# Patient Record
Sex: FEMALE | Race: WHITE | NOT HISPANIC OR LATINO | URBAN - METROPOLITAN AREA
[De-identification: names, ages, dates, MRNs, and addresses within clinical notes are randomized per-mention and may not be internally consistent; named-entity substitution may affect disease eponyms.]

---

## 2018-07-25 ENCOUNTER — EMERGENCY (EMERGENCY)
Facility: HOSPITAL | Age: 58
LOS: 1 days | Discharge: HOME | End: 2018-07-25
Attending: EMERGENCY MEDICINE
Payer: COMMERCIAL

## 2018-07-25 VITALS
RESPIRATION RATE: 16 BRPM | OXYGEN SATURATION: 98 % | DIASTOLIC BLOOD PRESSURE: 73 MMHG | HEIGHT: 63 IN | WEIGHT: 125 LBS | TEMPERATURE: 98 F | SYSTOLIC BLOOD PRESSURE: 122 MMHG | HEART RATE: 80 BPM

## 2018-07-25 VITALS
DIASTOLIC BLOOD PRESSURE: 76 MMHG | OXYGEN SATURATION: 97 % | RESPIRATION RATE: 16 BRPM | SYSTOLIC BLOOD PRESSURE: 127 MMHG | HEART RATE: 67 BPM

## 2018-07-25 LAB
ALBUMIN SERPL ELPH-MCNC: 3.7 G/DL — SIGNIFICANT CHANGE UP (ref 3.3–5)
ALP SERPL-CCNC: 79 U/L — SIGNIFICANT CHANGE UP (ref 40–120)
ALT FLD-CCNC: 22 U/L — SIGNIFICANT CHANGE UP (ref 12–78)
ANION GAP SERPL CALC-SCNC: 8 MMOL/L — SIGNIFICANT CHANGE UP (ref 5–17)
AST SERPL-CCNC: 17 U/L — SIGNIFICANT CHANGE UP (ref 15–37)
BASOPHILS # BLD AUTO: 0 K/UL — SIGNIFICANT CHANGE UP (ref 0–0.2)
BASOPHILS NFR BLD AUTO: 0 % — SIGNIFICANT CHANGE UP (ref 0–2)
BILIRUB SERPL-MCNC: 0.8 MG/DL — SIGNIFICANT CHANGE UP (ref 0.2–1.2)
BUN SERPL-MCNC: 8 MG/DL — SIGNIFICANT CHANGE UP (ref 7–23)
CALCIUM SERPL-MCNC: 9.4 MG/DL — SIGNIFICANT CHANGE UP (ref 8.5–10.1)
CHLORIDE SERPL-SCNC: 105 MMOL/L — SIGNIFICANT CHANGE UP (ref 96–108)
CO2 SERPL-SCNC: 25 MMOL/L — SIGNIFICANT CHANGE UP (ref 22–31)
CREAT SERPL-MCNC: 0.76 MG/DL — SIGNIFICANT CHANGE UP (ref 0.5–1.3)
D DIMER BLD IA.RAPID-MCNC: 228 NG/ML DDU — SIGNIFICANT CHANGE UP
EOSINOPHIL # BLD AUTO: 0 K/UL — SIGNIFICANT CHANGE UP (ref 0–0.5)
EOSINOPHIL NFR BLD AUTO: 0 % — SIGNIFICANT CHANGE UP (ref 0–6)
GLUCOSE SERPL-MCNC: 191 MG/DL — HIGH (ref 70–99)
HCT VFR BLD CALC: 37.6 % — SIGNIFICANT CHANGE UP (ref 34.5–45)
HGB BLD-MCNC: 12.7 G/DL — SIGNIFICANT CHANGE UP (ref 11.5–15.5)
HPIV3 RNA SPEC QL NAA+PROBE: DETECTED
LACTATE SERPL-SCNC: 0.8 MMOL/L — SIGNIFICANT CHANGE UP (ref 0.7–2)
LYMPHOCYTES # BLD AUTO: 0.5 K/UL — LOW (ref 1–3.3)
LYMPHOCYTES # BLD AUTO: 5 % — LOW (ref 13–44)
MCHC RBC-ENTMCNC: 31.5 PG — SIGNIFICANT CHANGE UP (ref 27–34)
MCHC RBC-ENTMCNC: 33.8 GM/DL — SIGNIFICANT CHANGE UP (ref 32–36)
MCV RBC AUTO: 93.3 FL — SIGNIFICANT CHANGE UP (ref 80–100)
MONOCYTES # BLD AUTO: 0.5 K/UL — SIGNIFICANT CHANGE UP (ref 0–0.9)
MONOCYTES NFR BLD AUTO: 5 % — SIGNIFICANT CHANGE UP (ref 2–14)
NEUTROPHILS # BLD AUTO: 8.91 K/UL — HIGH (ref 1.8–7.4)
NEUTROPHILS NFR BLD AUTO: 83 % — HIGH (ref 43–77)
PLATELET # BLD AUTO: 184 K/UL — SIGNIFICANT CHANGE UP (ref 150–400)
POTASSIUM SERPL-MCNC: 3.8 MMOL/L — SIGNIFICANT CHANGE UP (ref 3.5–5.3)
POTASSIUM SERPL-SCNC: 3.8 MMOL/L — SIGNIFICANT CHANGE UP (ref 3.5–5.3)
PROT SERPL-MCNC: 8.1 G/DL — SIGNIFICANT CHANGE UP (ref 6–8.3)
RAPID RVP RESULT: DETECTED
RBC # BLD: 4.03 M/UL — SIGNIFICANT CHANGE UP (ref 3.8–5.2)
RBC # FLD: 12.3 % — SIGNIFICANT CHANGE UP (ref 10.3–14.5)
SODIUM SERPL-SCNC: 138 MMOL/L — SIGNIFICANT CHANGE UP (ref 135–145)
WBC # BLD: 10.01 K/UL — SIGNIFICANT CHANGE UP (ref 3.8–10.5)
WBC # FLD AUTO: 10.01 K/UL — SIGNIFICANT CHANGE UP (ref 3.8–10.5)

## 2018-07-25 PROCEDURE — 87581 M.PNEUMON DNA AMP PROBE: CPT

## 2018-07-25 PROCEDURE — 99285 EMERGENCY DEPT VISIT HI MDM: CPT

## 2018-07-25 PROCEDURE — 85027 COMPLETE CBC AUTOMATED: CPT

## 2018-07-25 PROCEDURE — 83605 ASSAY OF LACTIC ACID: CPT

## 2018-07-25 PROCEDURE — 87798 DETECT AGENT NOS DNA AMP: CPT

## 2018-07-25 PROCEDURE — 71275 CT ANGIOGRAPHY CHEST: CPT | Mod: 26

## 2018-07-25 PROCEDURE — 93005 ELECTROCARDIOGRAM TRACING: CPT

## 2018-07-25 PROCEDURE — 87486 CHLMYD PNEUM DNA AMP PROBE: CPT

## 2018-07-25 PROCEDURE — 85379 FIBRIN DEGRADATION QUANT: CPT

## 2018-07-25 PROCEDURE — 87040 BLOOD CULTURE FOR BACTERIA: CPT

## 2018-07-25 PROCEDURE — 99284 EMERGENCY DEPT VISIT MOD MDM: CPT | Mod: 25

## 2018-07-25 PROCEDURE — 36415 COLL VENOUS BLD VENIPUNCTURE: CPT

## 2018-07-25 PROCEDURE — 96375 TX/PRO/DX INJ NEW DRUG ADDON: CPT

## 2018-07-25 PROCEDURE — 96374 THER/PROPH/DIAG INJ IV PUSH: CPT | Mod: 59

## 2018-07-25 PROCEDURE — 80053 COMPREHEN METABOLIC PANEL: CPT

## 2018-07-25 PROCEDURE — 87633 RESP VIRUS 12-25 TARGETS: CPT

## 2018-07-25 PROCEDURE — 71275 CT ANGIOGRAPHY CHEST: CPT

## 2018-07-25 RX ORDER — CEFTRIAXONE 500 MG/1
1 INJECTION, POWDER, FOR SOLUTION INTRAMUSCULAR; INTRAVENOUS ONCE
Qty: 0 | Refills: 0 | Status: COMPLETED | OUTPATIENT
Start: 2018-07-25 | End: 2018-07-25

## 2018-07-25 RX ORDER — SODIUM CHLORIDE 9 MG/ML
1000 INJECTION INTRAMUSCULAR; INTRAVENOUS; SUBCUTANEOUS ONCE
Qty: 0 | Refills: 0 | Status: COMPLETED | OUTPATIENT
Start: 2018-07-25 | End: 2018-07-25

## 2018-07-25 RX ORDER — MOMETASONE FUROATE AND FORMOTEROL FUMARATE DIHYDRATE 200; 5 UG/1; UG/1
0 AEROSOL RESPIRATORY (INHALATION)
Qty: 0 | Refills: 0 | COMMUNITY

## 2018-07-25 RX ORDER — ALBUTEROL 90 UG/1
0 AEROSOL, METERED ORAL
Qty: 0 | Refills: 0 | COMMUNITY

## 2018-07-25 RX ORDER — CIPROFLOXACIN LACTATE 400MG/40ML
1 VIAL (ML) INTRAVENOUS
Qty: 10 | Refills: 0 | OUTPATIENT
Start: 2018-07-25 | End: 2018-08-03

## 2018-07-25 RX ORDER — AZITHROMYCIN 500 MG/1
500 TABLET, FILM COATED ORAL ONCE
Qty: 0 | Refills: 0 | Status: COMPLETED | OUTPATIENT
Start: 2018-07-25 | End: 2018-07-25

## 2018-07-25 RX ORDER — IPRATROPIUM/ALBUTEROL SULFATE 18-103MCG
3 AEROSOL WITH ADAPTER (GRAM) INHALATION ONCE
Qty: 0 | Refills: 0 | Status: COMPLETED | OUTPATIENT
Start: 2018-07-25 | End: 2018-07-25

## 2018-07-25 RX ADMIN — SODIUM CHLORIDE 1000 MILLILITER(S): 9 INJECTION INTRAMUSCULAR; INTRAVENOUS; SUBCUTANEOUS at 14:07

## 2018-07-25 RX ADMIN — SODIUM CHLORIDE 1000 MILLILITER(S): 9 INJECTION INTRAMUSCULAR; INTRAVENOUS; SUBCUTANEOUS at 12:40

## 2018-07-25 RX ADMIN — Medication 100 MILLIGRAM(S): at 12:23

## 2018-07-25 RX ADMIN — CEFTRIAXONE 100 GRAM(S): 500 INJECTION, POWDER, FOR SOLUTION INTRAMUSCULAR; INTRAVENOUS at 12:50

## 2018-07-25 RX ADMIN — AZITHROMYCIN 255 MILLIGRAM(S): 500 TABLET, FILM COATED ORAL at 12:50

## 2018-07-25 NOTE — ED PROVIDER NOTE - MEDICAL DECISION MAKING DETAILS
productive cough, low grade temp, recent travel,  will obtain, labs, cxr, rvp, r/o pna, will give iv abx, meds

## 2018-07-25 NOTE — ED ADULT NURSE NOTE - OBJECTIVE STATEMENT
Patient presents with shortness of breath. Patient recently returned from a trip to [facility]. EKG being performed at this time.

## 2018-07-25 NOTE — ED PROVIDER NOTE - PROGRESS NOTE DETAILS
patient resting comfortably, results discussed, + parainfluenza, pna appearance on cta, copy of results given, rx for levaquin, tessalon perles, prednisone sent to pharmacy, patient has her own albuterol inhaler.  patient states she has pulmonary physician Dr Watikns,  advised follow up with her pulmonary doctor, and follow up with her pmd, advised stop smoking.  if any concerns, or condition worsens , return to ed.

## 2018-07-25 NOTE — ED PROVIDER NOTE - OBJECTIVE STATEMENT
58 y female presents with productive cough, low grade temp, intermittent sob x 3 days while traveling in [place],  [facility] she returned yesterday to [place],  denies sick contacts.  [facility] has hx of asthma, + smoker.  denies hx of pneumonia, did not have her flu shot this year.  [facility] she went to urgent care today, was informed her cxr could be pna or PE?, states was given a dose of duoneb and im steroids.  PMD Dr Reyes  PMH:Asthma , Eczema  ,Pyrosis

## 2018-07-25 NOTE — ED PROVIDER NOTE - ATTENDING CONTRIBUTION TO CARE
Cough productive of green sputum and fever. Exam revealed white female in mild distress secondary to wheezing and cough with scattered wheezes and coarse BSs. I agree with plan and management outlined by PA.

## 2018-07-30 LAB
CULTURE RESULTS: SIGNIFICANT CHANGE UP
CULTURE RESULTS: SIGNIFICANT CHANGE UP
SPECIMEN SOURCE: SIGNIFICANT CHANGE UP
SPECIMEN SOURCE: SIGNIFICANT CHANGE UP

## 2020-01-12 ENCOUNTER — EMERGENCY (EMERGENCY)
Facility: HOSPITAL | Age: 60
LOS: 1 days | Discharge: LEFT BEFORE DISCHARGE | End: 2020-01-12
Attending: EMERGENCY MEDICINE | Admitting: EMERGENCY MEDICINE
Payer: COMMERCIAL

## 2020-01-12 VITALS
SYSTOLIC BLOOD PRESSURE: 164 MMHG | DIASTOLIC BLOOD PRESSURE: 97 MMHG | TEMPERATURE: 98 F | HEIGHT: 63 IN | WEIGHT: 128.97 LBS | RESPIRATION RATE: 16 BRPM | OXYGEN SATURATION: 99 % | HEART RATE: 62 BPM

## 2020-01-12 PROCEDURE — 99285 EMERGENCY DEPT VISIT HI MDM: CPT

## 2020-01-12 PROCEDURE — 99282 EMERGENCY DEPT VISIT SF MDM: CPT

## 2020-01-12 NOTE — ED ADULT NURSE REASSESSMENT NOTE - NS ED NURSE REASSESS COMMENT FT1
Pt refused CT scan, refusing Vital signs, refusing all care, pts  has concurred that patient does not need any testing.

## 2020-01-12 NOTE — ED PROVIDER NOTE - PATIENT PORTAL LINK FT
You can access the FollowMyHealth Patient Portal offered by [facility] by registering at the following website: [email or web address]. By joining [employer]’s FollowMyHealth portal, you will also be able to view your health information using other applications (apps) compatible with our system.

## 2020-01-12 NOTE — ED PROVIDER NOTE - PROGRESS NOTE DETAILS
pt is refusing CT, refusing vitals, will sign out ama, understands risk of signing out ama including poss ICH, due to alcohol on board, pt understands and accepts responsibility

## 2020-01-12 NOTE — ED ADULT TRIAGE NOTE - CHIEF COMPLAINT QUOTE
trip and fell, hit head on falling, no loc trip and fell, hit head on falling, no loc, patient intoxicated

## 2021-09-24 NOTE — ED PROVIDER NOTE - OBJECTIVE STATEMENT
[FreeTextEntry1] : recheck CBC\par check lipids
60yo female bib ems s/p fall. pt was drinking and lost her balance and fell and hit her head, +LOC, pt denies any complaints but c/o headache, no dizziness, no vomiting, no other compalints

## 2022-04-13 ENCOUNTER — APPOINTMENT (OUTPATIENT)
Dept: ORTHOPEDIC SURGERY | Facility: CLINIC | Age: 62
End: 2022-04-13
Payer: OTHER MISCELLANEOUS

## 2022-04-13 VITALS — HEIGHT: 63 IN | WEIGHT: 116 LBS | BODY MASS INDEX: 20.55 KG/M2

## 2022-04-13 PROBLEM — Z00.00 ENCOUNTER FOR PREVENTIVE HEALTH EXAMINATION: Status: ACTIVE | Noted: 2022-04-13

## 2022-04-13 PROCEDURE — 99072 ADDL SUPL MATRL&STAF TM PHE: CPT

## 2022-04-13 PROCEDURE — 99214 OFFICE O/P EST MOD 30 MIN: CPT

## 2022-04-13 NOTE — HISTORY OF PRESENT ILLNESS
[Neck] : neck [8] : 8 [5] : 5 [Burning] : burning [Sharp] : sharp [Throbbing] : throbbing [Intermittent] : intermittent [Walking] : walking [Exercising] : exercising [Retired] : Work status: retired [Lower back] : lower back [Work related] : work related [de-identified] : WC 5/9/2003 - was carrying a heavy table at work\par \par 4/21/20: here for neck eval - radiating to the right arm - left arm not so bad \par \par Has done quite a bit of PT for this\par saw me for her back about a week ago and was given medications for this\par No injections/surgery on the neck\par Had right shoulder surgery done in 2010 which helped her shoulder\par \par MRI C spine 4/21/18 - disc herniations C5-7\par \par Not able to return to work at this point\par \par 5/14/20: here for fu - plan at last was "having worsening pain in the neck to the right arm - indicated for mri C spine - not able to return to work" - overall doing about the same - pain continues in the shoulder and down the right side - hurts between the thumb/index on the right side - the back pain continues at this point as well\par \par using flexeril/hydrocodone\par Was told PT was approved for the neck and shoulder\par \par MRI C spine - 1. Multilevel loss of disc signal and height.\par 2. C3-C4: Broad bulge with central herniation impressing on the thecal sac with no herniation or stenosis. Luschka hypertrophy with inferior left foraminal encroachment.\par 3. C4-C5: Broad bulge with central herniation impressing on the thecal sac with no stenosis. No foraminal encroachment.\par 4. C5-C6: Broad bulge and spondylotic ridge with broad herniation impressing on the thecal sac with no central stenosis. Luschka hypertrophy with foraminal encroachment, right greater than left\par 5. C6-C7: Broad bulge with broad herniation impressing on the thecal sac with no contact of the cord or stenosis. Luschka hypertrophy with inferior foraminal encroachment\par \par 2/26/21: Here for fu - plan at last was "reviewed the updated MRI with patient - questions answered - she woule like to continue with chiro - fu in 3 months" - overall the neck pain remains and into the right arm - having headaches - numbness in the right arm - going to chiro 1/month\par \par 4/12/21 - Pt here for fu - plan last visit was, "worsening neck pain/radiculopathy -indicated for MRi C spine for further eval - is going to take muscle relaxer/MDP for relief - cont with chiro as current - not able to return to work." pain remains worse with activity - worsening pain in the neck - right arm radiation - left arm going to the shoulder \par \par MRI C-spine 3/31/21 -1. C5-6: Broad-based central disc herniation impinging the thecal sac and impinging the ventral spinal cord. The herniation is superimposed on a disc bulge which causes moderate to severe right neuroforaminal stenosis. Mild narrowing of the left neuroforamen. Disc space narrowing. No change. \par 2. C6-7: Broad-based central disc herniation indenting the ventral spinal cord. The herniation is superimposed\par on a disc bulge which causes moderate to severe bilateral neuroforaminal stenosis. Disc space narrowing. The\par herniation and bulge are minimally increased in size since previous exam.\par 3. Additional smaller disc herniations and bulges, detailed above.\par \par 4/13/22: Here for fu - plan last visit was, "reviewed the MRi with her - I think she is a good candidate for ACDF c5-7 - SHEs not ready for surgery - referral to pain for USHA - not able to return to work." Overall doing about the same - she has been going to chiropractic care and had PT prior to the pandemic. 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

## 2022-04-13 NOTE — PHYSICAL EXAM
[Left lower extremity below knee] : left lower extremity below knee [Left lower extremity above knee] : left lower extremity above knee [Normal Coordination] : normal coordination [Normal Mood and Affect] : normal mood and affect [] : sensation of right upper extremities not intact [FreeTextEntry8] : right>left

## 2022-04-13 NOTE — DATA REVIEWED
[MRI] : MRI [Cervical Spine] : cervical spine [Report was reviewed and noted in the chart] : The report was reviewed and noted in the chart

## 2022-04-13 NOTE — DISCUSSION/SUMMARY
[de-identified] : Pain persists despite chiropractic care. Will need updated MRI L-spine to assess for HNP.

## 2022-04-13 NOTE — HISTORY OF PRESENT ILLNESS
[de-identified] : WC 1/17/18 \par \par 2/24/21: Here for fu - overall doing worse - last seen in may 2020 - pain in lower back - pain from the lower back and down the left leg \par \par Has been doing chiro in the meantime\par \par LAST mri FROM 2019\par \par using ALEVE WITHOUT RELIEF\par \par 4/12/21: Here for lumbar fu - plan at last was "INDICATED FOR mdp/FLEXERIL - mri l SPINE - FU TO REVIEW THE mrI - RETIRED"- overall doing about the same with regards to the lower back - worse with walking -- pain in the lower back and radiating down the left leg \par \par MRI L psine - 1. L3-4: Disc bulge which indents the thecal sac. The bulge causes mild bilateral neuroforaminal stenosis. Anterior annular tear. No change.\par 2. L4-5: Disc bulge indenting the thecal sac. The bulge causes mild bilateral neuroforaminal stenosis. No change.\par \par \par Physician Disclaimer: I have personally reviewed and confirmed all HPI data with the patient. \par \par 4/13/22: Here for fu - plan last visit was, " reviewed the MRi L spine - rec further conservative tx for this." Overall doing about the same - at times has pain into the right side of the back, she has good days and bad days and gets chiropractic treatements as needed. Pain shoots down the back of the left leg where there is numbness and tingling. No loss of b/b control. She takes aleve as needed.\par \par No seeing pain management

## 2022-04-13 NOTE — DISCUSSION/SUMMARY
[de-identified] : Case reviewed with her an her , continue pain to the neck despite conservative measures. she is a good candidate for ACDF c5-7. Will update the MRI C-spine r/o HNP

## 2022-04-13 NOTE — DATA REVIEWED
[MRI] : MRI [Lumbar Spine] : lumbar spine [Report was reviewed and noted in the chart] : The report was reviewed and noted in the chart

## 2022-04-13 NOTE — WORK
[Total] : total [Does not reveal pre-existing condition(s) that may affect treatment/prognosis] : does not reveal pre-existing condition(s) that may affect treatment/prognosis [Cannot return to work because ________] : cannot return to work because [unfilled] [No Rx restrictions] : No Rx restrictions. [I provided the services listed above] :  I provided the services listed above. [FreeTextEntry1] : guarded

## 2022-07-25 ENCOUNTER — APPOINTMENT (OUTPATIENT)
Dept: ORTHOPEDIC SURGERY | Facility: CLINIC | Age: 62
End: 2022-07-25

## 2022-07-25 VITALS — BODY MASS INDEX: 20.55 KG/M2 | WEIGHT: 116 LBS | HEIGHT: 63 IN

## 2022-07-25 VITALS — BODY MASS INDEX: 20.55 KG/M2 | HEIGHT: 63 IN | WEIGHT: 116 LBS

## 2022-07-25 PROCEDURE — 99072 ADDL SUPL MATRL&STAF TM PHE: CPT

## 2022-07-25 PROCEDURE — 99214 OFFICE O/P EST MOD 30 MIN: CPT

## 2022-07-25 NOTE — DATA REVIEWED
[Cervical Spine] : cervical spine [MRI] : MRI [Lumbar Spine] : lumbar spine [Report was reviewed and noted in the chart] : The report was reviewed and noted in the chart

## 2022-07-28 NOTE — DISCUSSION/SUMMARY
[de-identified] : Case reviewed with her an her , is having worsening pain in  the neck since previous MRI despite conservative measures, including chiropractic care X several years, and cyclobenzaprine. she is a good candidate for ACDF c5-7. Will update the MRI C-spine r/o HNP, which will be used to guide surgical management or possible injections.

## 2022-07-28 NOTE — HISTORY OF PRESENT ILLNESS
[Neck] : neck [8] : 8 [5] : 5 [Burning] : burning [Sharp] : sharp [Throbbing] : throbbing [Intermittent] : intermittent [Walking] : walking [Exercising] : exercising [Retired] : Work status: retired [Lower back] : lower back [Work related] : work related [de-identified] : WC 5/9/2003 - was carrying a heavy table at work\par \par 4/21/20: here for neck eval - radiating to the right arm - left arm not so bad \par \par Has done quite a bit of PT for this\par saw me for her back about a week ago and was given medications for this\par No injections/surgery on the neck\par Had right shoulder surgery done in 2010 which helped her shoulder\par \par MRI C spine 4/21/18 - disc herniations C5-7\par \par Not able to return to work at this point\par \par 5/14/20: here for fu - plan at last was "having worsening pain in the neck to the right arm - indicated for mri C spine - not able to return to work" - overall doing about the same - pain continues in the shoulder and down the right side - hurts between the thumb/index on the right side - the back pain continues at this point as well\par \par using flexeril/hydrocodone\par Was told PT was approved for the neck and shoulder\par \par MRI C spine - 1. Multilevel loss of disc signal and height.\par 2. C3-C4: Broad bulge with central herniation impressing on the thecal sac with no herniation or stenosis. Luschka hypertrophy with inferior left foraminal encroachment.\par 3. C4-C5: Broad bulge with central herniation impressing on the thecal sac with no stenosis. No foraminal encroachment.\par 4. C5-C6: Broad bulge and spondylotic ridge with broad herniation impressing on the thecal sac with no central stenosis. Luschka hypertrophy with foraminal encroachment, right greater than left\par 5. C6-C7: Broad bulge with broad herniation impressing on the thecal sac with no contact of the cord or stenosis. Luschka hypertrophy with inferior foraminal encroachment\par \par 2/26/21: Here for fu - plan at last was "reviewed the updated MRI with patient - questions answered - she woule like to continue with chiro - fu in 3 months" - overall the neck pain remains and into the right arm - having headaches - numbness in the right arm - going to chiro 1/month\par \par 4/12/21 - Pt here for fu - plan last visit was, "worsening neck pain/radiculopathy -indicated for MRi C spine for further eval - is going to take muscle relaxer/MDP for relief - cont with chiro as current - not able to return to work." pain remains worse with activity - worsening pain in the neck - right arm radiation - left arm going to the shoulder \par \par MRI C-spine 3/31/21 -1. C5-6: Broad-based central disc herniation impinging the thecal sac and impinging the ventral spinal cord. The herniation is superimposed on a disc bulge which causes moderate to severe right neuroforaminal stenosis. Mild narrowing of the left neuroforamen. Disc space narrowing. No change. \par 2. C6-7: Broad-based central disc herniation indenting the ventral spinal cord. The herniation is superimposed\par on a disc bulge which causes moderate to severe bilateral neuroforaminal stenosis. Disc space narrowing. The\par herniation and bulge are minimally increased in size since previous exam.\par 3. Additional smaller disc herniations and bulges, detailed above.\par \par 4/13/22: Here for fu - plan last visit was, "reviewed the MRi with her - I think she is a good candidate for ACDF c5-7 - SHEs not ready for surgery - referral to pain for USHA - not able to return to work." Overall doing about the same - she has been going to chiropractic care and had PT prior to the pandemic. She has not seen pain management recently \par \par 7/25/22: Here for fu-plan at last was "Case reviewed with her an her , continue pain to the neck despite conservative measures. she is a good candidate for ACDF c5-7. Will update the MRI C-spine r/o HNP" \par MRI denied. Pain is worse. Continued radiation down RUE to digits 2-3.  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

## 2022-07-28 NOTE — DISCUSSION/SUMMARY
[de-identified] : Pain worsening despite chiropractic care X several years. Will need updated MRI L-spine to assess for HNP, which will be used to guide potential injections/surgical management.

## 2022-07-28 NOTE — PHYSICAL EXAM
[Normal Coordination] : normal coordination [Normal Mood and Affect] : normal mood and affect [Left lower extremity below knee] : left lower extremity below knee [Left lower extremity above knee] : left lower extremity above knee [] : sensation of right upper extremities not intact [FreeTextEntry8] : right>left

## 2022-07-28 NOTE — HISTORY OF PRESENT ILLNESS
[de-identified] : WC 1/17/18 \par \par 2/24/21: Here for fu - overall doing worse - last seen in may 2020 - pain in lower back - pain from the lower back and down the left leg \par \par Has been doing chiro in the meantime\par \par LAST mri FROM 2019\par \par using ALEVE WITHOUT RELIEF\par \par 4/12/21: Here for lumbar fu - plan at last was "INDICATED FOR mdp/FLEXERIL - mri l SPINE - FU TO REVIEW THE mrI - RETIRED"- overall doing about the same with regards to the lower back - worse with walking -- pain in the lower back and radiating down the left leg \par \par MRI L psine - 1. L3-4: Disc bulge which indents the thecal sac. The bulge causes mild bilateral neuroforaminal stenosis. Anterior annular tear. No change.\par 2. L4-5: Disc bulge indenting the thecal sac. The bulge causes mild bilateral neuroforaminal stenosis. No change.\par \par \par Physician Disclaimer: I have personally reviewed and confirmed all HPI data with the patient. \par \par 4/13/22: Here for fu - plan last visit was, " reviewed the MRi L spine - rec further conservative tx for this." Overall doing about the same - at times has pain into the right side of the back, she has good days and bad days and gets chiropractic treatements as needed. Pain shoots down the back of the left leg where there is numbness and tingling. No loss of b/b control. She takes aleve as needed.\par \par No seeing pain management \par \par 7/25/22- here for fu. Plan at last was "Pain persists despite chiropractic care. Will need updated MRI L-spine to assess for HNP." LBP the same. Had severe flare of pain in her coccyx a few days ago. Radiation down LLE to foot.

## 2022-08-03 ENCOUNTER — FORM ENCOUNTER (OUTPATIENT)
Age: 62
End: 2022-08-03

## 2022-08-11 ENCOUNTER — FORM ENCOUNTER (OUTPATIENT)
Age: 62
End: 2022-08-11

## 2022-08-12 ENCOUNTER — APPOINTMENT (OUTPATIENT)
Dept: ORTHOPEDIC SURGERY | Facility: CLINIC | Age: 62
End: 2022-08-12

## 2022-08-12 ENCOUNTER — APPOINTMENT (OUTPATIENT)
Dept: MRI IMAGING | Facility: CLINIC | Age: 62
End: 2022-08-12

## 2022-08-12 PROCEDURE — 72148 MRI LUMBAR SPINE W/O DYE: CPT

## 2022-08-12 PROCEDURE — 99072 ADDL SUPL MATRL&STAF TM PHE: CPT

## 2022-08-29 ENCOUNTER — APPOINTMENT (OUTPATIENT)
Dept: ORTHOPEDIC SURGERY | Facility: CLINIC | Age: 62
End: 2022-08-29

## 2022-08-29 VITALS — WEIGHT: 116 LBS | BODY MASS INDEX: 20.55 KG/M2 | HEIGHT: 63 IN

## 2022-08-29 PROCEDURE — 99072 ADDL SUPL MATRL&STAF TM PHE: CPT

## 2022-08-29 PROCEDURE — 99215 OFFICE O/P EST HI 40 MIN: CPT

## 2022-08-29 NOTE — DATA REVIEWED
[MRI] : MRI [Report was reviewed and noted in the chart] : The report was reviewed and noted in the chart [Lumbar Spine] : lumbar spine [I independently reviewed and interpreted images and report] : I independently reviewed and interpreted images and report

## 2022-08-29 NOTE — DISCUSSION/SUMMARY
[de-identified] : Case reviewed with her an her , is having worsening pain in  the neck since previous MRI despite conservative measures, including chiropractic care X several years, and cyclobenzaprine. she is a good candidate for ACDF c5-7. \par \par we discussed I have indicated the patient for an Anterior Cervical Discectomy & Fusion C5-7\par \par e've discussed the surgery details including instrumentation and grafting options (local, allograft, ICBG, and biologics) as well as potential for complications including but not limited to pain, scar, loss of function, permenant injury, death, need for additional surgery, need for blood transfuion, prolonged hospitilization, prolonged recovery, lack of recovery, blood clots, pulmonary embolism, heart attack, stroke, or  infection. There is also a possibility for hardware complication such as malposition of hardware,hardware loosening, pullout, failure or fracture of bone, adjacent segmen tdisease, pseudarthrosis, and need for future surgery. Finally, we discussed potential for injury to nerves, spinal cord or blood vessels, paralysis, blindness, need for transfusion, general anesthesia, allergic reaction, prolonged intubation,myocardial infarction, stroke, deep venous thrombosis, pulmonary embolus, and death. The patient understands these things and all questions are answered tohis/her satisfaction.  The surgery may need to be paused or staged or not completed due to intraoperative events or at the surgeon's discretion.  Any injury that occurs may be permenate.  \par \par Spinal fluid leak may occur and may require prolonged time in the hospital or further surgical procedures \par Patient has been instructed to stop all Aspirin and NSAIDs 10 days prior to surgery date. For Coumadin and other blood thinners, the patient is referred to the medical doctor\par We discussed we will use neuromonitioring for the surgery in order to possibly migitate risks of injury. \par The procedure does not come with a guarantee of success or of satisfaction on the patient's behalf.\par At the surgeon's discretion he may call for assistance during the surgery or in the perioperative period \par \par she is going to think about it \par \par

## 2022-08-29 NOTE — WORK
[Total] : total [Does not reveal pre-existing condition(s) that may affect treatment/prognosis] : does not reveal pre-existing condition(s) that may affect treatment/prognosis [Cannot return to work because ________] : cannot return to work because [unfilled] [No Rx restrictions] : No Rx restrictions. [FreeTextEntry1] : guarded

## 2022-08-29 NOTE — PHYSICAL EXAM
[Normal Coordination] : normal coordination [Normal Mood and Affect] : normal mood and affect [] : sensation of right upper extremities not intact [FreeTextEntry8] : right>left

## 2022-10-14 ENCOUNTER — RX RENEWAL (OUTPATIENT)
Age: 62
End: 2022-10-14

## 2022-10-17 ENCOUNTER — APPOINTMENT (OUTPATIENT)
Dept: ORTHOPEDIC SURGERY | Facility: CLINIC | Age: 62
End: 2022-10-17

## 2022-10-17 VITALS — HEIGHT: 63 IN | WEIGHT: 116 LBS | BODY MASS INDEX: 20.55 KG/M2

## 2022-10-17 PROCEDURE — 99072 ADDL SUPL MATRL&STAF TM PHE: CPT

## 2022-10-17 PROCEDURE — 99214 OFFICE O/P EST MOD 30 MIN: CPT

## 2022-10-17 NOTE — DISCUSSION/SUMMARY
[de-identified] : reviewed the case/imaging \par cont with chiro FOR THE BACK \par \par \par has aleve\par \par retired - not able to return to work \par \par

## 2022-10-17 NOTE — HISTORY OF PRESENT ILLNESS
[Lower back] : lower back [8] : 8 [5] : 5 [Neck] : neck [Burning] : burning [Sharp] : sharp [Throbbing] : throbbing [Intermittent] : intermittent [Walking] : walking [Exercising] : exercising [Retired] : Work status: retired [de-identified] : WC 5/9/2003 - was carrying a heavy table at work\par \par 10/17/22: Here for fu today for the lower back - pain in the lwoer back and into the left hip - at times shooting down the left leg \par \par (she remains hesitent regarding surgery for the neck)\par \par the lower back pain remains\par \par chiro shes been doing  \par \par MRI L spine -1. No herniation or fracture.\par 2. L1-L2: Bulge, facet arthrosis, and ligamentum flavum hypertrophy.\par 3. L2-L3: Bulge, facet hypertrophy, and ligamentum flavum hypertrophy.\par 4. L3-L4: Bulge, facet hypertrophy, and ligamentum flavum hypertrophy.\par 5. L4-L5: Bulge, facet hypertrophy, and ligamentum flavum hypertrophy.\par 6. L5-S1: Bulge and facet hypertrophy.\par 7. 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

## 2022-12-09 ENCOUNTER — APPOINTMENT (OUTPATIENT)
Dept: ORTHOPEDIC SURGERY | Facility: CLINIC | Age: 62
End: 2022-12-09

## 2022-12-09 VITALS — WEIGHT: 116 LBS | HEIGHT: 63 IN | BODY MASS INDEX: 20.55 KG/M2

## 2022-12-09 PROCEDURE — 99214 OFFICE O/P EST MOD 30 MIN: CPT

## 2022-12-09 PROCEDURE — 99072 ADDL SUPL MATRL&STAF TM PHE: CPT

## 2022-12-09 NOTE — HISTORY OF PRESENT ILLNESS
[Work related] : work related [8] : 8 [Neck] : neck [Lower back] : lower back [5] : 5 [Burning] : burning [Sharp] : sharp [Throbbing] : throbbing [Intermittent] : intermittent [Walking] : walking [Exercising] : exercising [Retired] : Work status: retired [de-identified] : WC 5/9/2003 - was carrying a heavy table at work\par \par 10/17/22: Here for fu today for the lower back - pain in the lwoer back and into the left hip - at times shooting down the left leg \par \par (she remains hesitent regarding surgery for the neck)\par \par the lower back pain remains\par \par chiro shes been doing  \par \par MRI L spine -1. No herniation or fracture.\par 2. L1-L2: Bulge, facet arthrosis, and ligamentum flavum hypertrophy.\par 3. L2-L3: Bulge, facet hypertrophy, and ligamentum flavum hypertrophy.\par 4. L3-L4: Bulge, facet hypertrophy, and ligamentum flavum hypertrophy.\par 5. L4-L5: Bulge, facet hypertrophy, and ligamentum flavum hypertrophy.\par 6. L5-S1: Bulge and facet hypertrophy.\par 7. 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

## 2023-01-04 ENCOUNTER — FORM ENCOUNTER (OUTPATIENT)
Age: 63
End: 2023-01-04

## 2023-03-08 ENCOUNTER — APPOINTMENT (OUTPATIENT)
Dept: ORTHOPEDIC SURGERY | Facility: CLINIC | Age: 63
End: 2023-03-08
Payer: OTHER MISCELLANEOUS

## 2023-03-08 VITALS — BODY MASS INDEX: 20.55 KG/M2 | HEIGHT: 63 IN | WEIGHT: 116 LBS

## 2023-03-08 DIAGNOSIS — M54.16 RADICULOPATHY, LUMBAR REGION: ICD-10-CM

## 2023-03-08 PROCEDURE — 99214 OFFICE O/P EST MOD 30 MIN: CPT

## 2023-03-08 PROCEDURE — 99072 ADDL SUPL MATRL&STAF TM PHE: CPT

## 2023-03-08 NOTE — PHYSICAL EXAM
[Normal Coordination] : normal coordination [Normal Mood and Affect] : normal mood and affect [FreeTextEntry8] : right>left  [] : negative straight leg raise

## 2023-03-08 NOTE — DISCUSSION/SUMMARY
[de-identified] : reviewed the case/imaging \par cont with HEP \par PT she has run out of \par has aleve\par \par retired - not able to return to work \par \par fu 3-4 months

## 2023-03-08 NOTE — HISTORY OF PRESENT ILLNESS
[Lower back] : lower back [Work related] : work related [Gradual] : gradual [8] : 8 [5] : 5 [Burning] : burning [Sharp] : sharp [Throbbing] : throbbing [Intermittent] : intermittent [Rest] : rest [Physical therapy] : physical therapy [Walking] : walking [Exercising] : exercising [Retired] : Work status: retired [de-identified] :  DOI 1/17/18 Low back injury at work after heavy lifting\par \par 10/17/22: Here for fu today for the lower back - pain in the lwoer back and into the left hip - at times shooting down the left leg \par \par (she remains hesitent regarding surgery for the neck)\par \par the lower back pain remains\par \par chiro shes been doing  \par \par MRI L spine -1. No herniation or fracture.\par 2. L1-L2: Bulge, facet arthrosis, and ligamentum flavum hypertrophy.\par 3. L2-L3: Bulge, facet hypertrophy, and ligamentum flavum hypertrophy.\par 4. L3-L4: Bulge, facet hypertrophy, and ligamentum flavum hypertrophy.\par 5. L4-L5: Bulge, facet hypertrophy, and ligamentum flavum hypertrophy.\par 6. L5-S1: Bulge and facet hypertrophy.\par 7. Findings are not significantly changed from the prior study.\par \par 3/8/23: Here for fu on the low back; overall doing better with PT. 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

## 2023-08-31 ENCOUNTER — RX RENEWAL (OUTPATIENT)
Age: 63
End: 2023-08-31

## 2023-11-07 ENCOUNTER — RX RENEWAL (OUTPATIENT)
Age: 63
End: 2023-11-07

## 2024-01-30 ENCOUNTER — RX RENEWAL (OUTPATIENT)
Age: 64
End: 2024-01-30

## 2024-01-30 RX ORDER — CYCLOBENZAPRINE HYDROCHLORIDE 10 MG/1
10 TABLET, FILM COATED ORAL
Qty: 90 | Refills: 0 | Status: ACTIVE | COMMUNITY
Start: 2022-04-13 | End: 1900-01-01

## 2024-02-09 ENCOUNTER — APPOINTMENT (OUTPATIENT)
Dept: ORTHOPEDIC SURGERY | Facility: CLINIC | Age: 64
End: 2024-02-09
Payer: OTHER MISCELLANEOUS

## 2024-02-09 DIAGNOSIS — M51.26 OTHER INTERVERTEBRAL DISC DISPLACEMENT, LUMBAR REGION: ICD-10-CM

## 2024-02-09 DIAGNOSIS — M47.816 SPONDYLOSIS W/OUT MYELOPATHY OR RADICULOPATHY, LUMBAR REGION: ICD-10-CM

## 2024-02-09 DIAGNOSIS — E78.00 PURE HYPERCHOLESTEROLEMIA, UNSPECIFIED: ICD-10-CM

## 2024-02-09 PROCEDURE — 99214 OFFICE O/P EST MOD 30 MIN: CPT

## 2024-02-09 NOTE — WORK
[Total (100%)] : total (100%) [Does not reveal pre-existing condition(s) that may affect treatment/prognosis] : does not reveal pre-existing condition(s) that may affect treatment/prognosis [Cannot return to work because ________] : cannot return to work because [unfilled] [No Rx restrictions] : No Rx restrictions.

## 2024-02-09 NOTE — PHYSICAL EXAM
[Normal Coordination] : normal coordination [Normal Mood and Affect] : normal mood and affect [] : light touch intact throughout both lower extremities

## 2024-02-09 NOTE — HISTORY OF PRESENT ILLNESS
[Lower back] : lower back [Work related] : work related [Gradual] : gradual [8] : 8 [3] : 3 [Burning] : burning [Sharp] : sharp [Throbbing] : throbbing [Intermittent] : intermittent [Rest] : rest [Physical therapy] : physical therapy [Walking] : walking [Exercising] : exercising [Retired] : Work status: retired [] : This patient has had an injection before: yes

## 2024-02-19 ENCOUNTER — APPOINTMENT (OUTPATIENT)
Dept: ORTHOPEDIC SURGERY | Facility: CLINIC | Age: 64
End: 2024-02-19
Payer: OTHER MISCELLANEOUS

## 2024-02-19 VITALS — WEIGHT: 116 LBS | BODY MASS INDEX: 20.55 KG/M2 | HEIGHT: 63 IN

## 2024-02-19 VITALS — HEIGHT: 63 IN | WEIGHT: 116 LBS | BODY MASS INDEX: 20.55 KG/M2

## 2024-02-19 DIAGNOSIS — M54.12 RADICULOPATHY, CERVICAL REGION: ICD-10-CM

## 2024-02-19 DIAGNOSIS — M47.812 SPONDYLOSIS W/OUT MYELOPATHY OR RADICULOPATHY, CERVICAL REGION: ICD-10-CM

## 2024-02-19 PROCEDURE — 99214 OFFICE O/P EST MOD 30 MIN: CPT

## 2024-02-19 RX ORDER — LIDOCAINE 5% 700 MG/1
5 PATCH TOPICAL
Qty: 1 | Refills: 0 | Status: ACTIVE | COMMUNITY
Start: 2024-02-19 | End: 1900-01-01

## 2024-02-19 NOTE — HISTORY OF PRESENT ILLNESS
[Work related] : work related [Gradual] : gradual [8] : 8 [3] : 3 [Burning] : burning [Sharp] : sharp [Throbbing] : throbbing [Intermittent] : intermittent [Rest] : rest [Physical therapy] : physical therapy [Walking] : walking [Exercising] : exercising [Retired] : Work status: retired [Neck] : neck [de-identified] : /WC doi 5/9/03 neck  2/19/24 here for follow up, continues to have neck and BUE shoulder/Radicular pain. Reports chiro/PT no longer helping continues with HEP twice per week.   Reports n/t in upper extremities has lessened. 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

## 2024-02-19 NOTE — DISCUSSION/SUMMARY
[Medication Risks Reviewed] : Medication risks reviewed [de-identified] : reviewed the case no change in disablity  we discussed PT but will hold off at this point  Continue HEP  Continues to take Flexeril, recently refilled WIll give Rx for lidocaine TDP fu 3 months  retired

## 2024-02-19 NOTE — PHYSICAL EXAM
[Normal Coordination] : normal coordination [Normal Mood and Affect] : normal mood and affect [] : light touch intact throughout both upper extremities

## 2024-02-19 NOTE — WORK
[Total (100%)] : total (100%) [Does not reveal pre-existing condition(s) that may affect treatment/prognosis] : does not reveal pre-existing condition(s) that may affect treatment/prognosis [Cannot return to work because ________] : cannot return to work because [unfilled] [No Rx restrictions] : No Rx restrictions. [FreeTextEntry1] : guarded

## 2024-06-19 ENCOUNTER — EMERGENCY (EMERGENCY)
Facility: HOSPITAL | Age: 64
LOS: 1 days | Discharge: HOME | End: 2024-06-19
Attending: STUDENT IN AN ORGANIZED HEALTH CARE EDUCATION/TRAINING PROGRAM | Admitting: STUDENT IN AN ORGANIZED HEALTH CARE EDUCATION/TRAINING PROGRAM
Payer: MEDICARE

## 2024-06-19 VITALS
WEIGHT: 117.07 LBS | HEIGHT: 63 IN | OXYGEN SATURATION: 97 % | RESPIRATION RATE: 18 BRPM | TEMPERATURE: 98 F | DIASTOLIC BLOOD PRESSURE: 90 MMHG | SYSTOLIC BLOOD PRESSURE: 140 MMHG | HEART RATE: 60 BPM

## 2024-06-19 VITALS
HEART RATE: 62 BPM | RESPIRATION RATE: 18 BRPM | OXYGEN SATURATION: 98 % | DIASTOLIC BLOOD PRESSURE: 74 MMHG | TEMPERATURE: 98 F | SYSTOLIC BLOOD PRESSURE: 145 MMHG

## 2024-06-19 LAB
ALBUMIN SERPL ELPH-MCNC: 3.8 G/DL — SIGNIFICANT CHANGE UP (ref 3.3–5)
ALP SERPL-CCNC: 45 U/L — SIGNIFICANT CHANGE UP (ref 40–120)
ALT FLD-CCNC: 38 U/L — SIGNIFICANT CHANGE UP (ref 12–78)
ANION GAP SERPL CALC-SCNC: 9 MMOL/L — SIGNIFICANT CHANGE UP (ref 5–17)
AST SERPL-CCNC: 34 U/L — SIGNIFICANT CHANGE UP (ref 15–37)
BASOPHILS # BLD AUTO: 0.02 K/UL — SIGNIFICANT CHANGE UP (ref 0–0.2)
BASOPHILS NFR BLD AUTO: 0.3 % — SIGNIFICANT CHANGE UP (ref 0–2)
BILIRUB SERPL-MCNC: 0.9 MG/DL — SIGNIFICANT CHANGE UP (ref 0.2–1.2)
BUN SERPL-MCNC: 20 MG/DL — SIGNIFICANT CHANGE UP (ref 7–23)
CALCIUM SERPL-MCNC: 9.6 MG/DL — SIGNIFICANT CHANGE UP (ref 8.5–10.1)
CHLORIDE SERPL-SCNC: 106 MMOL/L — SIGNIFICANT CHANGE UP (ref 96–108)
CO2 SERPL-SCNC: 24 MMOL/L — SIGNIFICANT CHANGE UP (ref 22–31)
CREAT SERPL-MCNC: 0.7 MG/DL — SIGNIFICANT CHANGE UP (ref 0.5–1.3)
EGFR: 98 ML/MIN/1.73M2 — SIGNIFICANT CHANGE UP
EOSINOPHIL # BLD AUTO: 0.1 K/UL — SIGNIFICANT CHANGE UP (ref 0–0.5)
EOSINOPHIL NFR BLD AUTO: 1.6 % — SIGNIFICANT CHANGE UP (ref 0–6)
GLUCOSE SERPL-MCNC: 98 MG/DL — SIGNIFICANT CHANGE UP (ref 70–99)
HCT VFR BLD CALC: 36.9 % — SIGNIFICANT CHANGE UP (ref 34.5–45)
HGB BLD-MCNC: 12.4 G/DL — SIGNIFICANT CHANGE UP (ref 11.5–15.5)
IMM GRANULOCYTES NFR BLD AUTO: 0.2 % — SIGNIFICANT CHANGE UP (ref 0–0.9)
LYMPHOCYTES # BLD AUTO: 1.73 K/UL — SIGNIFICANT CHANGE UP (ref 1–3.3)
LYMPHOCYTES # BLD AUTO: 27.1 % — SIGNIFICANT CHANGE UP (ref 13–44)
MCHC RBC-ENTMCNC: 31.9 PG — SIGNIFICANT CHANGE UP (ref 27–34)
MCHC RBC-ENTMCNC: 33.6 GM/DL — SIGNIFICANT CHANGE UP (ref 32–36)
MCV RBC AUTO: 94.9 FL — SIGNIFICANT CHANGE UP (ref 80–100)
MONOCYTES # BLD AUTO: 0.62 K/UL — SIGNIFICANT CHANGE UP (ref 0–0.9)
MONOCYTES NFR BLD AUTO: 9.7 % — SIGNIFICANT CHANGE UP (ref 2–14)
NEUTROPHILS # BLD AUTO: 3.9 K/UL — SIGNIFICANT CHANGE UP (ref 1.8–7.4)
NEUTROPHILS NFR BLD AUTO: 61.1 % — SIGNIFICANT CHANGE UP (ref 43–77)
NRBC # BLD: 0 /100 WBCS — SIGNIFICANT CHANGE UP (ref 0–0)
PLATELET # BLD AUTO: 164 K/UL — SIGNIFICANT CHANGE UP (ref 150–400)
POTASSIUM SERPL-MCNC: 3.7 MMOL/L — SIGNIFICANT CHANGE UP (ref 3.5–5.3)
POTASSIUM SERPL-SCNC: 3.7 MMOL/L — SIGNIFICANT CHANGE UP (ref 3.5–5.3)
PROT SERPL-MCNC: 6.5 G/DL — SIGNIFICANT CHANGE UP (ref 6–8.3)
RBC # BLD: 3.89 M/UL — SIGNIFICANT CHANGE UP (ref 3.8–5.2)
RBC # FLD: 12.5 % — SIGNIFICANT CHANGE UP (ref 10.3–14.5)
SODIUM SERPL-SCNC: 139 MMOL/L — SIGNIFICANT CHANGE UP (ref 135–145)
WBC # BLD: 6.38 K/UL — SIGNIFICANT CHANGE UP (ref 3.8–10.5)
WBC # FLD AUTO: 6.38 K/UL — SIGNIFICANT CHANGE UP (ref 3.8–10.5)

## 2024-06-19 PROCEDURE — 71260 CT THORAX DX C+: CPT | Mod: 26,MC

## 2024-06-19 PROCEDURE — 74177 CT ABD & PELVIS W/CONTRAST: CPT | Mod: 26,MC

## 2024-06-19 PROCEDURE — 74177 CT ABD & PELVIS W/CONTRAST: CPT | Mod: MC

## 2024-06-19 PROCEDURE — 72131 CT LUMBAR SPINE W/O DYE: CPT | Mod: MC

## 2024-06-19 PROCEDURE — 71260 CT THORAX DX C+: CPT | Mod: MC

## 2024-06-19 PROCEDURE — 96374 THER/PROPH/DIAG INJ IV PUSH: CPT | Mod: XU

## 2024-06-19 PROCEDURE — 72128 CT CHEST SPINE W/O DYE: CPT | Mod: MC

## 2024-06-19 PROCEDURE — 99284 EMERGENCY DEPT VISIT MOD MDM: CPT | Mod: 25

## 2024-06-19 PROCEDURE — 85025 COMPLETE CBC W/AUTO DIFF WBC: CPT

## 2024-06-19 PROCEDURE — 99285 EMERGENCY DEPT VISIT HI MDM: CPT | Mod: FS

## 2024-06-19 PROCEDURE — 73552 X-RAY EXAM OF FEMUR 2/>: CPT | Mod: 26,50

## 2024-06-19 PROCEDURE — 36415 COLL VENOUS BLD VENIPUNCTURE: CPT

## 2024-06-19 PROCEDURE — 73130 X-RAY EXAM OF HAND: CPT

## 2024-06-19 PROCEDURE — 80053 COMPREHEN METABOLIC PANEL: CPT

## 2024-06-19 PROCEDURE — 73130 X-RAY EXAM OF HAND: CPT | Mod: 26,LT

## 2024-06-19 PROCEDURE — 72128 CT CHEST SPINE W/O DYE: CPT | Mod: 26,MC

## 2024-06-19 PROCEDURE — 72131 CT LUMBAR SPINE W/O DYE: CPT | Mod: 26,MC

## 2024-06-19 PROCEDURE — 73552 X-RAY EXAM OF FEMUR 2/>: CPT

## 2024-06-19 RX ORDER — OXYCODONE AND ACETAMINOPHEN 5; 325 MG/1; MG/1
1 TABLET ORAL
Qty: 12 | Refills: 0
Start: 2024-06-19 | End: 2024-06-21

## 2024-06-19 RX ORDER — MORPHINE SULFATE 50 MG/1
4 CAPSULE, EXTENDED RELEASE ORAL ONCE
Refills: 0 | Status: DISCONTINUED | OUTPATIENT
Start: 2024-06-19 | End: 2024-06-19

## 2024-06-19 RX ORDER — IBUPROFEN 200 MG
1 TABLET ORAL
Qty: 28 | Refills: 0
Start: 2024-06-19 | End: 2024-06-25

## 2024-06-19 RX ADMIN — MORPHINE SULFATE 4 MILLIGRAM(S): 50 CAPSULE, EXTENDED RELEASE ORAL at 16:22

## 2024-06-19 NOTE — ED PROVIDER NOTE - PHYSICAL EXAMINATION
Gen: No acute distress, non toxic  Head: NCAT  Eyes: pink conjunctivae, EOMI, PERRL  CV: RRR, nl s1/s2.  Resp: CTAB, normal rate and effort  GI: Abdomen soft, NT, ND. No rebound, no guarding  : No CVAT  Neuro: A&O x 3,  sensorimotor intact without deficits   MSK: No midline c-spine ttp or step offs. Full ROM ext x 4. +TTP thenar aspect L hand, +TTP proximal lateral thigh b/l. +TTP midline T-spine. +TTP b/l paraspinal lumbosacral region. +TTP left anterolateral lower ribs without palpable crepitus  Vasc: Peripheral pulses 2+ b/l, symmetric throughout  Skin: Areas of ecchymosis to right thigh, left forearm, left elbow, right humerus. Skin abrasions to thenar eminence left palm and right elbow

## 2024-06-19 NOTE — ED PROVIDER NOTE - NSFOLLOWUPINSTRUCTIONS_ED_ALL_ED_FT
- Return to the ED for any new or worsening symptoms.   - Take pain medication as directed  - Follow up with your doctor within 2-3 days.     Fracture    A fracture is a break in one of your bones. This can occur from a variety of injuries, especially traumatic ones. Symptoms include pain, bruising, or swelling. Do not use the injured limb. If a fracture is in one of the bones below your waist, do not put weight on that limb unless instructed to do so by your healthcare provider. Crutches or a cane may have been provided. A splint or cast may have been applied by your health care provider. Make sure to keep it dry and follow up with an orthopedist as instructed.    SEEK IMMEDIATE MEDICAL CARE IF YOU HAVE ANY OF THE FOLLOWING SYMPTOMS: numbness, tingling, increasing pain, or weakness in any part of the injured limb.
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
## 2024-06-19 NOTE — ED PROVIDER NOTE - PROGRESS NOTE DETAILS
Patient feeling better after medication.  CTs demonstrating left seventh rib fracture without pneumothorax otherwise no acute traumatic injuries.  All results reviewed with patient and family at bedside.  No respiratory distress or hypoxia.  Sent Rx for pain medication.  Patient instructed to follow-up with PMD discussed all results.  Return precautions discussed.

## 2024-06-19 NOTE — ED PROVIDER NOTE - PATIENT PORTAL LINK FT
You can access the FollowMyHealth Patient Portal offered by [facility] by registering at the following website: [email or web address]. By joining [facility]’s FollowMyHealth portal, you will also be able to view your health information using other applications (apps) compatible with our system.
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
## 2024-06-19 NOTE — ED ADULT NURSE NOTE - NSFALLHARMRISKINTERV_ED_ALL_ED
Assistance OOB with selected safe patient handling equipment if applicable/Assistance with ambulation/Communicate risk of Fall with Harm to all staff, patient, and family/Monitor gait and stability/Provide visual cue: red socks, yellow wristband, yellow gown, etc/Reinforce activity limits and safety measures with patient and family/Bed in lowest position, wheels locked, appropriate side rails in place/Call bell, personal items and telephone in reach/Instruct patient to call for assistance before getting out of bed/chair/stretcher/Non-slip footwear applied when patient is off stretcher/[place] to call system/Physically safe environment - no spills, clutter or unnecessary equipment/Purposeful Proactive Rounding/Room/bathroom lighting operational, light cord in reach
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
## 2024-07-26 ENCOUNTER — APPOINTMENT (OUTPATIENT)
Dept: ORTHOPEDIC SURGERY | Facility: CLINIC | Age: 64
End: 2024-07-26
Payer: OTHER MISCELLANEOUS

## 2024-07-26 VITALS — HEIGHT: 63 IN | WEIGHT: 116 LBS | BODY MASS INDEX: 20.55 KG/M2

## 2024-07-26 DIAGNOSIS — M54.16 RADICULOPATHY, LUMBAR REGION: ICD-10-CM

## 2024-07-26 DIAGNOSIS — M51.26 OTHER INTERVERTEBRAL DISC DISPLACEMENT, LUMBAR REGION: ICD-10-CM

## 2024-07-26 PROCEDURE — 99214 OFFICE O/P EST MOD 30 MIN: CPT

## 2024-07-26 NOTE — HISTORY OF PRESENT ILLNESS
[Neck] : neck [Lower back] : lower back [Work related] : work related [Gradual] : gradual [4] : 4 [3] : 3 [Burning] : burning [Sharp] : sharp [Throbbing] : throbbing [Intermittent] : intermittent [Rest] : rest [Physical therapy] : physical therapy [Walking] : walking [Exercising] : exercising [Retired] : Work status: retired [de-identified] : WC DOI 1/17/18 Low back injury at work after heavy lifting  10/17/22: Here for fu today for the lower back - pain in the lwoer back and into the left hip - at times shooting down the left leg   (she remains hesitent regarding surgery for the neck)  the lower back pain remains  chiro shes been doing    MRI L spine -1. No herniation or fracture. 2. L1-L2: Bulge, facet arthrosis, and ligamentum flavum hypertrophy. 3. L2-L3: Bulge, facet hypertrophy, and ligamentum flavum hypertrophy. 4. L3-L4: Bulge, facet hypertrophy, and ligamentum flavum hypertrophy. 5. L4-L5: Bulge, facet hypertrophy, and ligamentum flavum hypertrophy. 6. L5-S1: Bulge and facet hypertrophy. 7. Findings are not significantly changed from the prior study.  3/8/23: Here for fu on the low back; overall doing better with PT. There is less shooting pain down the leg on the left side  overall the pain remains in the back   2/9/24: Here for fu - plan at last was "reviewed the case/imaging  cont with HEP  PT she has run out of  has aleve retired - not able to return to work  fu 3-4 months" - overall the back pain remains  using muscle relaxer at times  aleve with relief  back pain remains - at times shoots down the legs  has seen chiro and PT   7/26/24: Here for fu - pain continues in the lower back - Continuing HEP and completed Flexeril with relief.     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

## 2024-07-26 NOTE — PHYSICAL EXAM
[Normal Coordination] : normal coordination [Normal Mood and Affect] : normal mood and affect [] : negative straight leg raise

## 2024-07-26 NOTE — DISCUSSION/SUMMARY
[Medication Risks Reviewed] : Medication risks reviewed [de-identified] : reviewed the case and the imaging with the patient  lumbar spine pain  reviewed the MRI of the L spine with her  discussion of the condition and treatment options cautions discussed questions answered discussion of natural history of the condition and what the next step would be lidocaine patches and muscle relaxer  needs to have the medication  referral to pain for conservative tx of the lower back

## 2024-07-29 ENCOUNTER — APPOINTMENT (OUTPATIENT)
Dept: ORTHOPEDIC SURGERY | Facility: CLINIC | Age: 64
End: 2024-07-29
Payer: OTHER MISCELLANEOUS

## 2024-07-29 VITALS — WEIGHT: 116 LBS | HEIGHT: 63 IN | BODY MASS INDEX: 20.55 KG/M2

## 2024-07-29 DIAGNOSIS — M54.12 RADICULOPATHY, CERVICAL REGION: ICD-10-CM

## 2024-07-29 DIAGNOSIS — M47.812 SPONDYLOSIS W/OUT MYELOPATHY OR RADICULOPATHY, CERVICAL REGION: ICD-10-CM

## 2024-07-29 PROCEDURE — 99214 OFFICE O/P EST MOD 30 MIN: CPT

## 2024-07-29 NOTE — DISCUSSION/SUMMARY
[Medication Risks Reviewed] : Medication risks reviewed [de-identified] : reviewed the case no change in disablity  we discussed PT but will hold off at this point  Continue HEP  Continues to take Flexeril, recently refilled WIll give Rx for lidocaine TDP fu 3 months  retired

## 2024-07-29 NOTE — WORK
[Menarche Age: ____] : age at menarche was [unfilled] [Menopause Age: ____] : age at menopause was [unfilled] [___] : Full Term: [unfilled] [Total (100%)] : total (100%) [Does not reveal pre-existing condition(s) that may affect treatment/prognosis] : does not reveal pre-existing condition(s) that may affect treatment/prognosis [Cannot return to work because ________] : cannot return to work because [unfilled] [No Rx restrictions] : No Rx restrictions. [FreeTextEntry1] : guarded

## 2024-07-29 NOTE — DISCUSSION/SUMMARY
[Medication Risks Reviewed] : Medication risks reviewed [de-identified] : reviewed the case no change in disablity  we discussed PT but will hold off at this point  Continue HEP  Continues to take Flexeril, recently refilled WIll give Rx for lidocaine TDP fu 3 months  retired

## 2024-07-29 NOTE — DISCUSSION/SUMMARY
[Medication Risks Reviewed] : Medication risks reviewed [de-identified] : reviewed the case no change in disablity  we discussed PT but will hold off at this point  Continue HEP  Continues to take Flexeril, recently refilled WIll give Rx for lidocaine TDP fu 3 months  retired

## 2024-08-04 NOTE — HISTORY OF PRESENT ILLNESS
[Lower back] : lower back [4] : 4 [Neck] : neck [Work related] : work related [Gradual] : gradual [8] : 8 [3] : 3 [Burning] : burning [Sharp] : sharp [Throbbing] : throbbing [Intermittent] : intermittent [Rest] : rest [Physical therapy] : physical therapy [Walking] : walking [Exercising] : exercising [Retired] : Work status: retired [de-identified] :  doi 5/9/03 neck  7/29/24: Here to follow up neck pain. Pain continues.   She had another injury in june not a WC. 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

## 2024-08-04 NOTE — HISTORY OF PRESENT ILLNESS
[Lower back] : lower back [4] : 4 [Neck] : neck [Work related] : work related [Gradual] : gradual [8] : 8 [3] : 3 [Burning] : burning [Sharp] : sharp [Throbbing] : throbbing [Intermittent] : intermittent [Rest] : rest [Physical therapy] : physical therapy [Walking] : walking [Exercising] : exercising [Retired] : Work status: retired [de-identified] :  doi 5/9/03 neck  7/29/24: Here to follow up neck pain. Pain continues.   She had another injury in june not a WC. 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

## 2024-08-04 NOTE — HISTORY OF PRESENT ILLNESS
[Lower back] : lower back [4] : 4 [Neck] : neck [Work related] : work related [Gradual] : gradual [8] : 8 [3] : 3 [Burning] : burning [Sharp] : sharp [Throbbing] : throbbing [Intermittent] : intermittent [Rest] : rest [Physical therapy] : physical therapy [Walking] : walking [Exercising] : exercising [Retired] : Work status: retired [de-identified] :  doi 5/9/03 neck  7/29/24: Here to follow up neck pain. Pain continues.   She had another injury in june not a WC. 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

## 2024-08-12 ENCOUNTER — APPOINTMENT (OUTPATIENT)
Dept: ORTHOPEDIC SURGERY | Facility: CLINIC | Age: 64
End: 2024-08-12

## 2025-01-20 ENCOUNTER — APPOINTMENT (OUTPATIENT)
Dept: ORTHOPEDIC SURGERY | Facility: CLINIC | Age: 65
End: 2025-01-20
Payer: OTHER MISCELLANEOUS

## 2025-01-22 ENCOUNTER — APPOINTMENT (OUTPATIENT)
Dept: ORTHOPEDIC SURGERY | Facility: CLINIC | Age: 65
End: 2025-01-22
Payer: OTHER MISCELLANEOUS

## 2025-01-22 DIAGNOSIS — M47.812 SPONDYLOSIS W/OUT MYELOPATHY OR RADICULOPATHY, CERVICAL REGION: ICD-10-CM

## 2025-01-22 DIAGNOSIS — M54.12 RADICULOPATHY, CERVICAL REGION: ICD-10-CM

## 2025-01-22 PROCEDURE — 99213 OFFICE O/P EST LOW 20 MIN: CPT

## 2025-06-18 ENCOUNTER — APPOINTMENT (OUTPATIENT)
Dept: ORTHOPEDIC SURGERY | Facility: CLINIC | Age: 65
End: 2025-06-18
Payer: OTHER MISCELLANEOUS

## 2025-06-18 PROCEDURE — 72110 X-RAY EXAM L-2 SPINE 4/>VWS: CPT

## 2025-06-18 PROCEDURE — 99213 OFFICE O/P EST LOW 20 MIN: CPT

## 2025-06-18 PROCEDURE — 72170 X-RAY EXAM OF PELVIS: CPT
